# Patient Record
Sex: MALE | Race: WHITE | NOT HISPANIC OR LATINO | URBAN - METROPOLITAN AREA
[De-identification: names, ages, dates, MRNs, and addresses within clinical notes are randomized per-mention and may not be internally consistent; named-entity substitution may affect disease eponyms.]

---

## 2023-02-22 NOTE — ASU PATIENT PROFILE, ADULT - FALL HARM RISK - UNIVERSAL INTERVENTIONS
Bed in lowest position, wheels locked, appropriate side rails in place/Call bell, personal items and telephone in reach/Instruct patient to call for assistance before getting out of bed or chair/Non-slip footwear when patient is out of bed/[place] to call system/Physically safe environment - no spills, clutter or unnecessary equipment/Purposeful Proactive Rounding/Room/bathroom lighting operational, light cord in reach

## 2023-02-22 NOTE — ASU PATIENT PROFILE, ADULT - NSICDXPASTSURGICALHX_GEN_ALL_CORE_FT
PAST SURGICAL HISTORY:  H/O umbilical hernia repair     S/P cholecystectomy      PAST SURGICAL HISTORY:  H/O umbilical hernia repair     History of tonsillectomy     Macular hole, right     S/P cholecystectomy

## 2023-02-22 NOTE — ASU PATIENT PROFILE, ADULT - NS PREOP UNDERSTANDS INFO
spoke to patient to be NPO after 0300  am tonight.  No solid / liquid  . Dress comfortable. all valuable  at home.  Bring ID photo, insurance and vaccination cards,  escort arranged.  address and telephone given to patient/yes

## 2023-02-22 NOTE — ASU PATIENT PROFILE, ADULT - HEALTHCARE INFORMATION NEEDED, PROFILE
post operative instructions , medication to pharmacy, and pos surgical fallow up appointments with MD

## 2023-02-23 ENCOUNTER — OUTPATIENT (OUTPATIENT)
Dept: OUTPATIENT SERVICES | Facility: HOSPITAL | Age: 68
LOS: 1 days | Discharge: HOME | End: 2023-02-23

## 2023-02-23 VITALS
DIASTOLIC BLOOD PRESSURE: 91 MMHG | SYSTOLIC BLOOD PRESSURE: 147 MMHG | TEMPERATURE: 98 F | RESPIRATION RATE: 16 BRPM | OXYGEN SATURATION: 96 % | HEIGHT: 71 IN | HEART RATE: 81 BPM | WEIGHT: 218.48 LBS

## 2023-02-23 VITALS
RESPIRATION RATE: 17 BRPM | TEMPERATURE: 98 F | DIASTOLIC BLOOD PRESSURE: 78 MMHG | SYSTOLIC BLOOD PRESSURE: 135 MMHG | OXYGEN SATURATION: 98 % | HEART RATE: 60 BPM

## 2023-02-23 DIAGNOSIS — H35.341 MACULAR CYST, HOLE, OR PSEUDOHOLE, RIGHT EYE: Chronic | ICD-10-CM

## 2023-02-23 DIAGNOSIS — H35.341 MACULAR CYST, HOLE, OR PSEUDOHOLE, RIGHT EYE: ICD-10-CM

## 2023-02-23 DIAGNOSIS — Z90.89 ACQUIRED ABSENCE OF OTHER ORGANS: Chronic | ICD-10-CM

## 2023-02-23 DIAGNOSIS — Z98.890 OTHER SPECIFIED POSTPROCEDURAL STATES: Chronic | ICD-10-CM

## 2023-02-23 DIAGNOSIS — Z90.49 ACQUIRED ABSENCE OF OTHER SPECIFIED PARTS OF DIGESTIVE TRACT: Chronic | ICD-10-CM

## 2023-02-23 RX ORDER — ACETAMINOPHEN 500 MG
2 TABLET ORAL
Qty: 0 | Refills: 0 | DISCHARGE
Start: 2023-02-23

## 2023-02-23 RX ORDER — ACETAMINOPHEN 500 MG
1000 TABLET ORAL ONCE
Refills: 0 | Status: DISCONTINUED | OUTPATIENT
Start: 2023-02-23 | End: 2023-02-23

## 2023-02-23 RX ORDER — ATROPINE SULFATE 1 %
1 DROPS OPHTHALMIC (EYE)
Refills: 0 | Status: COMPLETED | OUTPATIENT
Start: 2023-02-23 | End: 2023-02-23

## 2023-02-23 RX ORDER — ONDANSETRON 8 MG/1
4 TABLET, FILM COATED ORAL ONCE
Refills: 0 | Status: DISCONTINUED | OUTPATIENT
Start: 2023-02-23 | End: 2023-02-23

## 2023-02-23 RX ORDER — TROPICAMIDE 1 %
1 DROPS OPHTHALMIC (EYE)
Refills: 0 | Status: COMPLETED | OUTPATIENT
Start: 2023-02-23 | End: 2023-02-23

## 2023-02-23 RX ORDER — PHENYLEPHRINE HCL 2.5 %
1 DROPS OPHTHALMIC (EYE)
Refills: 0 | Status: COMPLETED | OUTPATIENT
Start: 2023-02-23 | End: 2023-02-23

## 2023-02-23 RX ORDER — POTASSIUM CHLORIDE 20 MEQ
1 PACKET (EA) ORAL
Qty: 0 | Refills: 0 | DISCHARGE

## 2023-02-23 RX ORDER — SODIUM CHLORIDE 9 MG/ML
500 INJECTION, SOLUTION INTRAVENOUS
Refills: 0 | Status: DISCONTINUED | OUTPATIENT
Start: 2023-02-23 | End: 2023-02-23

## 2023-02-23 RX ORDER — SODIUM CHLORIDE 9 MG/ML
1000 INJECTION, SOLUTION INTRAVENOUS
Refills: 0 | Status: DISCONTINUED | OUTPATIENT
Start: 2023-02-23 | End: 2023-02-23

## 2023-02-23 RX ORDER — HYDROCHLOROTHIAZIDE 25 MG
1 TABLET ORAL
Qty: 0 | Refills: 0 | DISCHARGE

## 2023-02-23 RX ORDER — OFLOXACIN 0.3 %
1 DROPS OPHTHALMIC (EYE)
Refills: 0 | Status: COMPLETED | OUTPATIENT
Start: 2023-02-23 | End: 2023-02-23

## 2023-02-23 RX ADMIN — Medication 1 DROP(S): at 14:27

## 2023-02-23 RX ADMIN — Medication 1 DROP(S): at 14:32

## 2023-02-23 RX ADMIN — Medication 1 DROP(S): at 14:23

## 2023-02-23 RX ADMIN — Medication 1 DROP(S): at 14:28

## 2023-02-23 RX ADMIN — Medication 1 DROP(S): at 14:33

## 2023-02-23 RX ADMIN — Medication 1 DROP(S): at 14:22

## 2023-02-23 NOTE — PRE-ANESTHESIA EVALUATION ADULT - NSPROPOSEDPROCEDFT_GEN_ALL_CORE
Right eye attempt macular hole repair via pars plana vitrectomy, inner limiting membrane peel gas insertion

## 2023-02-23 NOTE — PRE-ANESTHESIA EVALUATION ADULT - NSANTHOSAYNRD_GEN_A_CORE
No. MAUREEN screening performed.  STOP BANG Legend: 0-2 = LOW Risk; 3-4 = INTERMEDIATE Risk; 5-8 = HIGH Risk Yes

## 2023-02-23 NOTE — ASU DISCHARGE PLAN (ADULT/PEDIATRIC) - NS MD DC FALL RISK RISK
For information on Fall & Injury Prevention, visit: [email or web address] OR  [email or web address] OR  https://www.cdc.gov/steadi/patient.html

## 2023-02-23 NOTE — OPERATIVE REPORT - OPERATIVE RPOSRT DETAILS
The patient was seen in the office and found to have a macular hole in the right eye. Informed consent for surgical repair was obtained. The patient was brought into the operative room. A surgical timeout was performed. Anesthesia provided IV sedation. A retrobulbar block consisting of a 1:1 mixture of 2% lidocaine and 0.75% bupivicaine was administered. The eye was then prepped and draped in usual sterile fashion for ophthalmic surgery. A lid speculum was placed in the operative eye.     The microcannulae of a 25g vitrectomy system were placed superotemporally, superonasally, and inferotemporally 4mm posterior to the limbus. The infusion line was connected and confirmed to be in the intravitreal position. Under the RESIGHT system, a core vitrectomy was performed. Kenalog was injected into the eye to confirm a PVD was present. The peripheral vitreous was shaved. Brilliant blue was instilled to stain the ILM. ILM was peeled with ILM forceps off the macula and around the macular hole. Scleral depression revealed no retinal breaks. Fluid air exchange was performed. Gas air exchange was then performed with 22% SF6 gas.     The microcannulae were removed and sutured as needed with 8-0 vicryl to ensure they were gastight. The intraocular pressure was normotensive. Subconjunctival ancef and dexamethasone was injected. The eye was cleaned and covered with a patch and shield. The patient left for recovery in good condition and was instructed to maintain a face down position.

## 2023-12-21 ENCOUNTER — NEW PATIENT (OUTPATIENT)
Dept: URBAN - METROPOLITAN AREA SURGICAL CENTER 72 | Facility: SURGICAL CENTER | Age: 68
End: 2023-12-21

## 2023-12-21 DIAGNOSIS — H25.813: ICD-10-CM

## 2023-12-21 DIAGNOSIS — H52.203: ICD-10-CM

## 2023-12-21 DIAGNOSIS — H43.393: ICD-10-CM

## 2023-12-21 DIAGNOSIS — H04.123: ICD-10-CM

## 2023-12-21 DIAGNOSIS — H52.11: ICD-10-CM

## 2023-12-21 DIAGNOSIS — H52.4: ICD-10-CM

## 2023-12-21 DIAGNOSIS — H52.12: ICD-10-CM

## 2023-12-21 PROCEDURE — 99204 OFFICE O/P NEW MOD 45 MIN: CPT

## 2023-12-21 PROCEDURE — 92015 DETERMINE REFRACTIVE STATE: CPT

## 2023-12-21 ASSESSMENT — TONOMETRY
OS_IOP_MMHG: 16
OS_IOP_MMHG: 17
OD_IOP_MMHG: 17
OD_IOP_MMHG: 14

## 2023-12-21 ASSESSMENT — KERATOMETRY
OS_AXISANGLE2_DEGREES: 66
OD_AXISANGLE_DEGREES: 148
OD_K2POWER_DIOPTERS: 46.00
OD_AXISANGLE2_DEGREES: 58
OD_K1POWER_DIOPTERS: 45.50
OS_K2POWER_DIOPTERS: 44.75
OS_AXISANGLE_DEGREES: 156
OS_K1POWER_DIOPTERS: 43.25

## 2023-12-21 ASSESSMENT — VISUAL ACUITY
OD_CC: 20/100
OS_SC: 20/40
OD_SC: 20/300
OS_CC: 20/30

## 2024-02-02 ENCOUNTER — CONSULTATION (OUTPATIENT)
Dept: URBAN - METROPOLITAN AREA SURGICAL CENTER 72 | Facility: SURGICAL CENTER | Age: 69
End: 2024-02-02

## 2024-02-02 DIAGNOSIS — H52.203: ICD-10-CM

## 2024-02-02 DIAGNOSIS — H25.813: ICD-10-CM

## 2024-02-02 DIAGNOSIS — H04.123: ICD-10-CM

## 2024-02-02 DIAGNOSIS — H43.393: ICD-10-CM

## 2024-02-02 DIAGNOSIS — H35.341: ICD-10-CM

## 2024-02-02 PROCEDURE — 99214 OFFICE O/P EST MOD 30 MIN: CPT

## 2024-02-02 PROCEDURE — 92025 CPTRIZED CORNEAL TOPOGRAPHY: CPT

## 2024-02-02 PROCEDURE — 92134 CPTRZ OPH DX IMG PST SGM RTA: CPT

## 2024-02-02 ASSESSMENT — VISUAL ACUITY
OU_SC: J2
OD_GLARE: 20/200
OS_CC: 20/30-2
OD_CC: 20/200
OS_GLARE: 20/50

## 2024-02-02 ASSESSMENT — KERATOMETRY
OS_AXISANGLE2_DEGREES: 66
OS_AXISANGLE_DEGREES: 156
OD_K2POWER_DIOPTERS: 46.00
OS_K1POWER_DIOPTERS: 43.25
OD_K1POWER_DIOPTERS: 45.50
OD_AXISANGLE_DEGREES: 148
OD_AXISANGLE2_DEGREES: 58
OS_K2POWER_DIOPTERS: 44.75

## 2024-02-02 ASSESSMENT — TONOMETRY
OD_IOP_MMHG: 16
OS_IOP_MMHG: 15

## 2024-02-15 ENCOUNTER — DIAGNOSTICS ONLY (OUTPATIENT)
Dept: URBAN - METROPOLITAN AREA SURGICAL CENTER 72 | Facility: SURGICAL CENTER | Age: 69
End: 2024-02-15

## 2024-02-15 DIAGNOSIS — H25.813: ICD-10-CM

## 2024-02-15 PROCEDURE — 92136 OPHTHALMIC BIOMETRY: CPT

## 2024-02-15 ASSESSMENT — KERATOMETRY
OS_K2POWER_DIOPTERS: 44.75
OD_AXISANGLE_DEGREES: 148
OD_K1POWER_DIOPTERS: 45.50
OS_K1POWER_DIOPTERS: 43.25
OS_AXISANGLE2_DEGREES: 66
OD_K2POWER_DIOPTERS: 46.00
OD_AXISANGLE2_DEGREES: 58
OS_AXISANGLE_DEGREES: 156

## 2024-04-08 ENCOUNTER — PRE-OP CATARACT MEASUREMENTS (OUTPATIENT)
Dept: URBAN - METROPOLITAN AREA SURGICAL CENTER 72 | Facility: SURGICAL CENTER | Age: 69
End: 2024-04-08

## 2024-04-08 DIAGNOSIS — H25.813: ICD-10-CM

## 2024-04-08 DIAGNOSIS — H04.123: ICD-10-CM

## 2024-04-08 PROCEDURE — 99213 OFFICE O/P EST LOW 20 MIN: CPT

## 2024-04-08 ASSESSMENT — KERATOMETRY
OD_AXISANGLE2_DEGREES: 58
OS_AXISANGLE_DEGREES: 156
OS_K2POWER_DIOPTERS: 44.75
OS_AXISANGLE2_DEGREES: 66
OS_K1POWER_DIOPTERS: 43.25
OD_K1POWER_DIOPTERS: 45.50
OD_AXISANGLE_DEGREES: 148
OD_K2POWER_DIOPTERS: 46.00

## 2024-04-08 ASSESSMENT — VISUAL ACUITY
OD_CC: 20/200
OS_CC: 20/40-2

## 2024-04-24 ENCOUNTER — SURGERY/PROCEDURE (OUTPATIENT)
Dept: URBAN - METROPOLITAN AREA SURGICAL CENTER 53 | Facility: SURGICAL CENTER | Age: 69
End: 2024-04-24

## 2024-04-24 DIAGNOSIS — H25.813: ICD-10-CM

## 2024-04-24 PROCEDURE — 66984 XCAPSL CTRC RMVL W/O ECP: CPT

## 2024-04-24 PROCEDURE — MISCFEMTO FEMTO

## 2024-04-25 ENCOUNTER — POST-OP CHECK (OUTPATIENT)
Dept: URBAN - METROPOLITAN AREA SURGICAL CENTER 72 | Facility: SURGICAL CENTER | Age: 69
End: 2024-04-25

## 2024-04-25 DIAGNOSIS — Z96.1: ICD-10-CM

## 2024-04-25 PROCEDURE — 99024 POSTOP FOLLOW-UP VISIT: CPT

## 2024-04-25 ASSESSMENT — KERATOMETRY
OS_K1POWER_DIOPTERS: 43.25
OD_AXISANGLE2_DEGREES: 58
OD_AXISANGLE2_DEGREES: 58
OS_K2POWER_DIOPTERS: 44.75
OS_K1POWER_DIOPTERS: 43.25
OD_K2POWER_DIOPTERS: 46.00
OD_K2POWER_DIOPTERS: 46.00
OD_AXISANGLE_DEGREES: 148
OS_AXISANGLE_DEGREES: 156
OS_AXISANGLE_DEGREES: 156
OS_K2POWER_DIOPTERS: 44.75
OS_AXISANGLE2_DEGREES: 66
OD_AXISANGLE_DEGREES: 148
OS_AXISANGLE2_DEGREES: 66
OD_K1POWER_DIOPTERS: 45.50
OD_K1POWER_DIOPTERS: 45.50

## 2024-04-25 ASSESSMENT — VISUAL ACUITY: OD_SC: 20/200-2

## 2024-04-25 ASSESSMENT — TONOMETRY
OD_IOP_MMHG: 19
OD_IOP_MMHG: 20

## 2024-05-03 ENCOUNTER — POST-OP CHECK (OUTPATIENT)
Dept: URBAN - METROPOLITAN AREA SURGICAL CENTER 72 | Facility: SURGICAL CENTER | Age: 69
End: 2024-05-03

## 2024-05-03 DIAGNOSIS — Z96.1: ICD-10-CM

## 2024-05-03 PROCEDURE — 99024 POSTOP FOLLOW-UP VISIT: CPT

## 2024-05-03 ASSESSMENT — KERATOMETRY
OD_AXISANGLE2_DEGREES: 58
OD_K1POWER_DIOPTERS: 45.50
OS_K2POWER_DIOPTERS: 44.75
OS_AXISANGLE_DEGREES: 156
OD_K2POWER_DIOPTERS: 46.00
OS_AXISANGLE2_DEGREES: 66
OD_AXISANGLE_DEGREES: 148
OS_K1POWER_DIOPTERS: 43.25

## 2024-05-03 ASSESSMENT — VISUAL ACUITY
OD_SC: J2
OD_CC: 20/25
OD_SC: 20/150

## 2024-05-03 ASSESSMENT — TONOMETRY: OD_IOP_MMHG: 13

## 2024-06-07 ENCOUNTER — POST-OP CHECK (OUTPATIENT)
Dept: URBAN - METROPOLITAN AREA SURGICAL CENTER 72 | Facility: SURGICAL CENTER | Age: 69
End: 2024-06-07

## 2024-06-07 DIAGNOSIS — Z96.1: ICD-10-CM

## 2024-06-07 DIAGNOSIS — H52.12: ICD-10-CM

## 2024-06-07 PROCEDURE — 99024 POSTOP FOLLOW-UP VISIT: CPT

## 2024-06-07 PROCEDURE — 92015 DETERMINE REFRACTIVE STATE: CPT

## 2024-06-07 ASSESSMENT — VISUAL ACUITY
OS_CC: 20/25-1
OD_SC: 20/150
OD_PH: 20/100

## 2024-06-07 ASSESSMENT — KERATOMETRY
OS_AXISANGLE2_DEGREES: 66
OD_K1POWER_DIOPTERS: 45.50
OS_K2POWER_DIOPTERS: 44.75
OS_AXISANGLE_DEGREES: 156
OD_AXISANGLE_DEGREES: 148
OD_K2POWER_DIOPTERS: 46.00
OS_K1POWER_DIOPTERS: 43.25
OD_AXISANGLE2_DEGREES: 58

## 2024-06-07 ASSESSMENT — TONOMETRY
OS_IOP_MMHG: 16
OD_IOP_MMHG: 16

## 2025-04-21 ENCOUNTER — PROCEDURE ONLY (OUTPATIENT)
Dept: URBAN - METROPOLITAN AREA SURGICAL CENTER 72 | Facility: SURGICAL CENTER | Age: 70
End: 2025-04-21

## 2025-04-21 DIAGNOSIS — H26.491: ICD-10-CM

## 2025-04-21 PROCEDURE — 66821 AFTER CATARACT LASER SURGERY: CPT

## 2025-04-21 ASSESSMENT — TONOMETRY
OS_IOP_MMHG: 17
OD_IOP_MMHG: 14

## 2025-04-21 ASSESSMENT — VISUAL ACUITY
OD_CC: 20/70
OS_CC: 20/30

## 2025-05-16 ENCOUNTER — FOLLOW UP (OUTPATIENT)
Dept: URBAN - METROPOLITAN AREA SURGICAL CENTER 72 | Facility: SURGICAL CENTER | Age: 70
End: 2025-05-16

## 2025-05-16 DIAGNOSIS — H53.8: ICD-10-CM

## 2025-05-16 PROCEDURE — 92014 COMPRE OPH EXAM EST PT 1/>: CPT | Mod: 24

## 2025-05-16 ASSESSMENT — VISUAL ACUITY
OS_CC: 20/25
OD_CC: 20/40

## 2025-05-16 ASSESSMENT — TONOMETRY
OD_IOP_MMHG: 13
OS_IOP_MMHG: 14

## 2025-08-22 ENCOUNTER — ESTABLISHED COMPREHENSIVE EXAM (OUTPATIENT)
Dept: URBAN - METROPOLITAN AREA SURGICAL CENTER 72 | Facility: SURGICAL CENTER | Age: 70
End: 2025-08-22

## 2025-08-22 DIAGNOSIS — H52.12: ICD-10-CM

## 2025-08-22 DIAGNOSIS — H25.812: ICD-10-CM

## 2025-08-22 DIAGNOSIS — H04.123: ICD-10-CM

## 2025-08-22 PROCEDURE — 92015 DETERMINE REFRACTIVE STATE: CPT

## 2025-08-22 PROCEDURE — 92014 COMPRE OPH EXAM EST PT 1/>: CPT

## 2025-08-22 ASSESSMENT — KERATOMETRY
OS_K2POWER_DIOPTERS: 46.50
OD_K2POWER_DIOPTERS: 46.00
OS_AXISANGLE2_DEGREES: 93
OS_AXISANGLE_DEGREES: 3
OD_K1POWER_DIOPTERS: 45.50
OD_AXISANGLE2_DEGREES: 44
OD_AXISANGLE_DEGREES: 134
OS_K1POWER_DIOPTERS: 45.75

## 2025-08-22 ASSESSMENT — VISUAL ACUITY
OD_CC: 20/50
OD_PH: 20/40
OD_SC: 20/150
OS_PH: 20/25
OS_CC: 20/40
OS_SC: 20/70

## 2025-08-22 ASSESSMENT — TONOMETRY
OD_IOP_MMHG: 12
OS_IOP_MMHG: 15

## (undated) DEVICE — DRAPE MICROSCOPE KNOB COVER SMALL (2 PCS)

## (undated) DEVICE — ILM RESOLUTION FORCEP 25G DISP

## (undated) DEVICE — CANNULA BLUNT TIP 25GA

## (undated) DEVICE — WARMING BLANKET LOWER ADULT

## (undated) DEVICE — VENODYNE/SCD SLEEVE CALF MEDIUM

## (undated) DEVICE — PACK VITRECTOMY  LF

## (undated) DEVICE — GLV 7.5 PROTEXIS (WHITE)

## (undated) DEVICE — ELCTR WETFIELD ERASER FINE TIP 25GA

## (undated) DEVICE — ILM FORCEP 25G PLUS

## (undated) DEVICE — CANNULA ALCON SOFT TIP 25G

## (undated) DEVICE — Device

## (undated) DEVICE — BACKFLUSH SOFT TIP 25G

## (undated) DEVICE — LENSE FLAT

## (undated) DEVICE — DRSG TEGADERM 4X4.75"

## (undated) DEVICE — PACK CONSTELLATION POST 25G 20K